# Patient Record
Sex: MALE | Race: WHITE | NOT HISPANIC OR LATINO
[De-identification: names, ages, dates, MRNs, and addresses within clinical notes are randomized per-mention and may not be internally consistent; named-entity substitution may affect disease eponyms.]

---

## 2017-03-07 PROBLEM — Z00.00 ENCOUNTER FOR PREVENTIVE HEALTH EXAMINATION: Status: ACTIVE | Noted: 2017-03-07

## 2017-04-04 ENCOUNTER — APPOINTMENT (OUTPATIENT)
Dept: CARDIOLOGY | Facility: CLINIC | Age: 67
End: 2017-04-04

## 2017-04-12 ENCOUNTER — APPOINTMENT (OUTPATIENT)
Dept: CARDIOLOGY | Facility: CLINIC | Age: 67
End: 2017-04-12

## 2017-04-27 ENCOUNTER — APPOINTMENT (OUTPATIENT)
Dept: CARDIOLOGY | Facility: CLINIC | Age: 67
End: 2017-04-27

## 2017-05-23 ENCOUNTER — APPOINTMENT (OUTPATIENT)
Dept: ELECTROPHYSIOLOGY | Facility: CLINIC | Age: 67
End: 2017-05-23

## 2017-05-23 VITALS
HEIGHT: 72 IN | WEIGHT: 150 LBS | BODY MASS INDEX: 20.32 KG/M2 | HEART RATE: 51 BPM | OXYGEN SATURATION: 96 % | SYSTOLIC BLOOD PRESSURE: 125 MMHG | DIASTOLIC BLOOD PRESSURE: 73 MMHG

## 2017-05-23 DIAGNOSIS — I25.2 OLD MYOCARDIAL INFARCTION: ICD-10-CM

## 2017-05-23 DIAGNOSIS — I49.9 CARDIAC ARRHYTHMIA, UNSPECIFIED: ICD-10-CM

## 2017-05-23 DIAGNOSIS — I10 ESSENTIAL (PRIMARY) HYPERTENSION: ICD-10-CM

## 2017-05-23 RX ORDER — MAGNESIUM 200 MG
200 TABLET ORAL
Refills: 0 | Status: ACTIVE | COMMUNITY

## 2017-05-23 RX ORDER — OMEPRAZOLE 20 MG/1
20 CAPSULE, DELAYED RELEASE ORAL
Refills: 0 | Status: ACTIVE | COMMUNITY

## 2017-05-23 RX ORDER — ATENOLOL 50 MG/1
50 TABLET ORAL
Refills: 0 | Status: ACTIVE | COMMUNITY

## 2017-05-23 RX ORDER — CLOPIDOGREL 75 MG/1
75 TABLET, FILM COATED ORAL
Refills: 0 | Status: ACTIVE | COMMUNITY

## 2017-05-23 RX ORDER — MULTIVITAMIN/IRON/FOLIC ACID 18MG-0.4MG
TABLET ORAL
Refills: 0 | Status: ACTIVE | COMMUNITY

## 2017-05-23 RX ORDER — OXYCODONE 5 MG/1
5 TABLET ORAL
Refills: 0 | Status: ACTIVE | COMMUNITY

## 2017-05-23 RX ORDER — UBIDECARENONE 100 MG
100 CAPSULE ORAL
Refills: 0 | Status: ACTIVE | COMMUNITY

## 2017-05-23 RX ORDER — ASPIRIN ENTERIC COATED TABLETS 81 MG 81 MG/1
81 TABLET, DELAYED RELEASE ORAL
Refills: 0 | Status: ACTIVE | COMMUNITY

## 2017-05-23 RX ORDER — ATORVASTATIN CALCIUM 40 MG/1
40 TABLET, FILM COATED ORAL
Refills: 0 | Status: ACTIVE | COMMUNITY

## 2017-05-23 RX ORDER — FOSINOPRIL SODIUM 40 MG/1
40 TABLET ORAL
Refills: 0 | Status: ACTIVE | COMMUNITY

## 2017-10-30 ENCOUNTER — APPOINTMENT (OUTPATIENT)
Dept: CARDIOLOGY | Facility: CLINIC | Age: 67
End: 2017-10-30

## 2018-08-03 ENCOUNTER — OUTPATIENT (OUTPATIENT)
Dept: OUTPATIENT SERVICES | Facility: HOSPITAL | Age: 68
LOS: 1 days | End: 2018-08-03

## 2018-08-17 ENCOUNTER — INPATIENT (INPATIENT)
Facility: HOSPITAL | Age: 68
LOS: 2 days | Discharge: HOME | End: 2018-08-20
Payer: MEDICARE

## 2018-08-17 PROCEDURE — 73560 X-RAY EXAM OF KNEE 1 OR 2: CPT | Mod: 26,RT

## 2018-08-18 ENCOUNTER — OUTPATIENT (OUTPATIENT)
Dept: OUTPATIENT SERVICES | Facility: HOSPITAL | Age: 68
LOS: 1 days | End: 2018-08-18

## 2018-08-19 ENCOUNTER — OUTPATIENT (OUTPATIENT)
Dept: OUTPATIENT SERVICES | Facility: HOSPITAL | Age: 68
LOS: 1 days | End: 2018-08-19

## 2018-08-20 ENCOUNTER — OUTPATIENT (OUTPATIENT)
Dept: OUTPATIENT SERVICES | Facility: HOSPITAL | Age: 68
LOS: 1 days | End: 2018-08-20

## 2018-09-05 ENCOUNTER — OUTPATIENT (OUTPATIENT)
Dept: OUTPATIENT SERVICES | Facility: HOSPITAL | Age: 68
LOS: 1 days | Discharge: HOME | End: 2018-09-05

## 2019-07-09 ENCOUNTER — APPOINTMENT (OUTPATIENT)
Dept: NEUROSURGERY | Facility: CLINIC | Age: 69
End: 2019-07-09
Payer: MEDICARE

## 2019-07-09 VITALS
DIASTOLIC BLOOD PRESSURE: 90 MMHG | HEIGHT: 70 IN | BODY MASS INDEX: 22.62 KG/M2 | SYSTOLIC BLOOD PRESSURE: 144 MMHG | WEIGHT: 158 LBS

## 2019-07-09 DIAGNOSIS — M48.02 SPINAL STENOSIS, CERVICAL REGION: ICD-10-CM

## 2019-07-09 PROCEDURE — 99204 OFFICE O/P NEW MOD 45 MIN: CPT

## 2019-07-09 NOTE — REASON FOR VISIT
[New Patient Visit] : a new patient visit [Referred By: _________] : Patient was referred by [facility] [FreeTextEntry1] : NECK PAIN- WEAKNESS.URSZULA CT SCAN SCANNED INTO CHART.

## 2019-07-09 NOTE — RESULTS/DATA
[FreeTextEntry1] : CT scan of the cervical spine was available for review. He is unable to get an MRI because he has an ICD.  Tessa-Zaida study was reviewed with the patient in detail. It shows multilevel cervical spondylosis with some degree of stenosis. The cord itself can't be directly assessed however there does not appear to be critical narrowing.  The full report is scanned into the EMR.

## 2019-07-09 NOTE — PLAN
[FreeTextEntry1] : DIAGNOSIS:  CERVICAL SPONDYLOSIS\par TREATMENT PLAN:  NON-SURGICAL\par \par This is a patient with cervical spondylosis.  I have recommended nonsurgical management at this time.  This consists of physical therapy and/or manual medicine in conjunction to medical therapy and other conservative methods.  These include the consideration of trigger point injections and or the utilization of modalities such as TENS where applicable.  The next tier would be the referral to a pain management specialist (anesthesia or physiatry) for the consideration of spinal injections.  This includes the options of epidural steroids, facet injections as well as other novel techniques that may provide pain relief.  Although there is indeed evidence of disk degeneration on imaging, discectomy or spinal fusion for the sole purposes of treating neck pain in the absence of a compressive lesion or neurological issue is associated with poor outcomes.   \par \par Discussed the history, physical exam findings & the cervical spine CT imaging with the patient. All questions answered. The patient is currently under the care of the ENT service who diagnosed the patient with an inner ear dysfunction & seeing Dr. Dunbar from pain management. The patient has some cervical pathology but most likely not the source of his symptoms. Neurosurgical intervention not warranted at this time.  The patient also demonstrates symptoms of carpal tunnel syndrome. May benefit from night splinting. EMG/NCV study for confirmation. The patient may follow-up on an as needed basis.\par \par \par I have reviewed the images in detail with the patient today in my office and have answered all questions regarding this condition to the best of my ability to the patient’s satisfaction.

## 2019-07-24 NOTE — PHYSICAL EXAM
[General Appearance - Alert] : alert [General Appearance - In No Acute Distress] : in no acute distress [Oriented To Time, Place, And Person] : oriented to person, place, and time [General Appearance - Well Nourished] : well nourished [Mood] : the mood was normal [Impaired Insight] : insight and judgment were intact [Affect] : the affect was normal [Place] : oriented to place [Memory Recent] : recent memory was not impaired [Person] : oriented to person [Time] : oriented to time [Motor Handedness Right-Handed] : the patient is right hand dominant [Sensation Tactile Decrease] : light touch was intact [2+] : Brachioradialis left 2+ [Normal Lordosis] : normal lordosis [No Visual Abnormalities] : no visible abnormalities [No Tenderness to Palpation] : no spine tenderness on palpation [Full ROM] : full ROM [Extraocular Movements] : extraocular movements were intact [Hearing Threshold Finger Rub Not [place]] : hearing was normal [] : no respiratory distress [Abnormal Walk] : normal gait [Involuntary Movements] : no involuntary movements were seen [Motor Tone] : muscle strength and tone were normal [Skin Color & Pigmentation] : normal skin color and pigmentation [Skin Turgor] : normal skin turgor [Balance] : balance was intact [Normal] : normal [Intact] : all reflexes within normal limits bilaterally [Romberg's Sign] : Romberg's sign was negtive [Dysesthesia] : no dysesthesia [Hyperesthesia] : no hyperesthesia [Tremor] : no tremor present [Aldana] : Aldana's sign was not demonstrated [L'Hermitte's] : neck flexion did not produce tingling down the spine/arms [Spurling's - Opposite Side] : Negative Spurling's on opposite side [Spurling's Same Side] : Negative Spurling's on same side [FreeTextEntry1] : + Phalens test.

## 2019-07-24 NOTE — PHYSICAL EXAM
[General Appearance - Alert] : alert [General Appearance - In No Acute Distress] : in no acute distress [Oriented To Time, Place, And Person] : oriented to person, place, and time [General Appearance - Well Nourished] : well nourished [Impaired Insight] : insight and judgment were intact [Mood] : the mood was normal [Affect] : the affect was normal [Place] : oriented to place [Memory Recent] : recent memory was not impaired [Person] : oriented to person [Motor Handedness Right-Handed] : the patient is right hand dominant [Time] : oriented to time [Sensation Tactile Decrease] : light touch was intact [2+] : Triceps left 2+ [Normal Lordosis] : normal lordosis [No Visual Abnormalities] : no visible abnormalities [No Tenderness to Palpation] : no spine tenderness on palpation [Full ROM] : full ROM [Extraocular Movements] : extraocular movements were intact [Hearing Threshold Finger Rub Not [place]] : hearing was normal [] : no respiratory distress [Abnormal Walk] : normal gait [Involuntary Movements] : no involuntary movements were seen [Motor Tone] : muscle strength and tone were normal [Skin Color & Pigmentation] : normal skin color and pigmentation [Skin Turgor] : normal skin turgor [Balance] : balance was intact [Normal] : normal [Intact] : all reflexes within normal limits bilaterally [Romberg's Sign] : Romberg's sign was negtive [Dysesthesia] : no dysesthesia [Aldana] : Aldana's sign was not demonstrated [Tremor] : no tremor present [Hyperesthesia] : no hyperesthesia [L'Hermitte's] : neck flexion did not produce tingling down the spine/arms [Spurling's - Opposite Side] : Negative Spurling's on opposite side [Spurling's Same Side] : Negative Spurling's on same side [FreeTextEntry1] : + Phalens test.

## 2019-07-24 NOTE — HISTORY OF PRESENT ILLNESS
[> 3 months] : more  than 3 months [de-identified] : 68 y/o male presents to the neurosurgery office for an initial office visit for evaluation of his balance/coordination. The patient states that these symptoms have been present since 2015. No associated trauma/injury reported. He reports intermittent neck pain with numbness/tingling noted in the right hand. Denies hand clumsiness or changes in hand writing. The numbness/tingling is noted mainly in the night or when the patient wakes up in the morning. The patient has been evaluated by Dr. Neal (ENT) who diagnosed the patient with a right inner ear disorder. He is currently taking Gabapentin which was prescribed by Dr. Dunbar & going to balance therapy which has provided some relief. He takes hydrocodone 7.5 on occasion. He presents today with a CT scan of the cervical spine since the patient has history of AICD that was placed in Nov 2018. Retired - . He has no overt symptoms suggestive of cervical myelopathy such as dexterity issues or overt falling.  He states his balance is been actually pretty fair recently. He offered that he went to [place] on a boat and felt quite stable. [FreeTextEntry1] : 70 y/o male presents to the neurosurgery office for an initial office visit for evaluation of his balance/coordination. The patient states that these symptoms have been present since 2015. No associated trauma/injury reported. He reports intermittent neck pain with numbness/tingling noted in the right hand. Denies hand clumsiness or changes in hand writing. The numbness/tingling is noted mainly in the night or when the patient wakes up in the morning. The patient has been evaluated by Dr. Neal (ENT) who diagnosed the patient with a right inner ear disorder. He is currently taking Gabapentin which was prescribed by Dr. Dunbar & going to balance therapy which has provided some relief. He takes hydrocodone 7.5 on occasion. He presents today with a CT scan of the cervical spine since the patient has history of AICD that was placed in Nov 2018. Retired - .

## 2019-07-24 NOTE — HISTORY OF PRESENT ILLNESS
[> 3 months] : more  than 3 months [de-identified] : 68 y/o male presents to the neurosurgery office for an initial office visit for evaluation of his balance/coordination. The patient states that these symptoms have been present since 2015. No associated trauma/injury reported. He reports intermittent neck pain with numbness/tingling noted in the right hand. Denies hand clumsiness or changes in hand writing. The numbness/tingling is noted mainly in the night or when the patient wakes up in the morning. The patient has been evaluated by Dr. Neal (ENT) who diagnosed the patient with a right inner ear disorder. He is currently taking Gabapentin which was prescribed by Dr. Dunbar & going to balance therapy which has provided some relief. He takes hydrocodone 7.5 on occasion. He presents today with a CT scan of the cervical spine since the patient has history of AICD that was placed in Nov 2018. Retired - . He has no overt symptoms suggestive of cervical myelopathy such as dexterity issues or overt falling.  He states his balance is been actually pretty fair recently. He offered that he went to [place] on a boat and felt quite stable. [FreeTextEntry1] : 68 y/o male presents to the neurosurgery office for an initial office visit for evaluation of his balance/coordination. The patient states that these symptoms have been present since 2015. No associated trauma/injury reported. He reports intermittent neck pain with numbness/tingling noted in the right hand. Denies hand clumsiness or changes in hand writing. The numbness/tingling is noted mainly in the night or when the patient wakes up in the morning. The patient has been evaluated by Dr. Neal (ENT) who diagnosed the patient with a right inner ear disorder. He is currently taking Gabapentin which was prescribed by Dr. Dunbar & going to balance therapy which has provided some relief. He takes hydrocodone 7.5 on occasion. He presents today with a CT scan of the cervical spine since the patient has history of AICD that was placed in Nov 2018. Retired - .

## 2019-07-24 NOTE — ASSESSMENT
[FreeTextEntry1] : Discussed the history, physical exam findings & the cervical spine CT imaging with the patient. All questions answered. The patient is currently under the care of the ENT service who diagnosed the patient with an inner ear dysfunction & seeing Dr. Dunbar from pain management. The patient has some cervical pathology but most likely not the source of his symptoms. Neurosurgical intervention not warranted at this time.  The patient also demonstrates symptoms of carpal tunnel syndrome. May benefit from night splinting. EMG/NCV study for confirmation. The patient may follow-up on an as needed basis.